# Patient Record
Sex: FEMALE | URBAN - METROPOLITAN AREA
[De-identification: names, ages, dates, MRNs, and addresses within clinical notes are randomized per-mention and may not be internally consistent; named-entity substitution may affect disease eponyms.]

---

## 2024-08-29 ENCOUNTER — TELEPHONE (OUTPATIENT)
Dept: MATERNAL FETAL MEDICINE | Facility: CLINIC | Age: 19
End: 2024-08-29
Payer: MEDICAID

## 2024-08-29 DIAGNOSIS — O43.892 OTHER PLACENTAL DISORDERS, SECOND TRIMESTER: Primary | ICD-10-CM

## 2024-08-30 DIAGNOSIS — O43.892 OTHER PLACENTAL DISORDERS, SECOND TRIMESTER: Primary | ICD-10-CM

## 2024-08-30 PROBLEM — O46.8X2 SUBCHORIONIC HEMORRHAGE OF PLACENTA IN SECOND TRIMESTER: Status: ACTIVE | Noted: 2024-08-30

## 2024-08-30 NOTE — PROGRESS NOTES
"  Maternal Fetal Medicine New Consult    Subjective:     Patient ID: [id number]    Chief Complaint: MFM Consult w/us  (For evaluation of retroplacental clot  /)      HPI: 18 y.o.  female  at 17w0d gestation with Estimated Date of Delivery:  2025. by LMP, consistent with early US. She is sent for MFM consultation for abnormal ultrasound.     She had concern for abnormal ultrasound with "retroplacental clot"/subchorionic hemorrhage on outside ultrasound.  She reports vaginal bleeding early in the 1st trimester but has not had any bleeding since.  She does not remember the last episode of bleeding.  She has increased BMI of 27 at initial MFM consult visit.  She denies any known chronic medical illnesses.  Patient was accompanied by her significant other Kirk       She denies any personal or family history of aneuploidy or anomalies. She denies any exposure to high fevers, viral rashes, illicit drugs or alcohol in this pregnancy.  She denies any leaking fluid, vaginal bleeding, contractions, decreased fetal movement. Denies headaches, visual disturbances, or epigastric pain.       Pregnancy complications include:  Patient Active Problem List   Diagnosis    Subchorionic hemorrhage of placenta in second trimester    (BMI 25.0-29.9)    At high risk for complications of intrauterine pregnancy (IUP)    Abnormal ultrasound of placenta       History reviewed. No pertinent past medical history.    History reviewed. No pertinent surgical history.    Family History   Problem Relation Name Age of Onset    Cancer Maternal Grandmother      Glaucoma Mother         Social History     Socioeconomic History    Marital status: Unknown   Tobacco Use    Smoking status: Never     Passive exposure: Never    Smokeless tobacco: Never   Substance and Sexual Activity    Alcohol use: Never    Drug use: Never    Sexual activity: Yes     Partners: Male       Current Outpatient Medications   Medication Sig Dispense Refill    ondansetron " "(ZOFRAN-ODT) 8 MG TbDL Take 8 mg by mouth every 6 (six) hours as needed.       27 mg iron- 1 mg Tab Take 1 tablet by mouth.      aspirin (ECOTRIN) 81 MG EC tablet Take 1 tablet (81 mg total) by mouth once daily. Start after 12 weeks gestation. 30 tablet 10     No current facility-administered medications for this visit.       Review of patient's allergies indicates:  No Known Allergies     Review of Systems   12 point review of systems conducted, negative except as stated in the history of present illness. See HPI for details.      Objective:     Visit Vitals  /62 (BP Location: Left arm, Patient Position: Sitting, BP Method: Medium (Automatic))   Pulse 75   Ht 5' 2" (1.575 m)   Wt 67.1 kg (148 lb)   LMP  (LMP Unknown)   BMI 27.07 kg/m²        Physical Exam  Vitals and nursing note reviewed.   Constitutional:       General: She is not in acute distress.     Appearance: Normal appearance.   HENT:      Head: Normocephalic and atraumatic.      Nose: Nose normal. No congestion.      Mouth/Throat:      Pharynx: Oropharynx is clear.   Eyes:      General: No scleral icterus.     Conjunctiva/sclera: Conjunctivae normal.   Cardiovascular:      Rate and Rhythm: Normal rate and regular rhythm.   Pulmonary:      Effort: No respiratory distress.      Breath sounds: Normal breath sounds. No wheezing.   Abdominal:      General: Abdomen is flat.      Palpations: Abdomen is soft.      Tenderness: There is no abdominal tenderness. There is no right CVA tenderness, left CVA tenderness or guarding.      Comments: No CVA tenderness gravid uterus.    Musculoskeletal:         General: Normal range of motion.      Cervical back: Neck supple.      Right lower leg: No edema.      Left lower leg: No edema.   Skin:     General: Skin is warm.      Findings: No bruising or rash.   Neurological:      General: No focal deficit present.      Mental Status: She is oriented to person, place, and time.      Deep Tendon Reflexes: Reflexes " normal.      Comments: Normal reflexes   Psychiatric:         Mood and Affect: Mood normal.         Behavior: Behavior normal.         Thought Content: Thought content normal.         Judgment: Judgment normal.         Assessment/Plan:     18 y.o.  female with IUP at 17w0d    Subchorionic hemorrhage  There is normal fetal growth and no anomalies seen on fetal anatomy scan on 9/3/24.  AFV is normal.     Anterior subchorionic hemorrhage seen measuring 2.3 x 3.3 x 1 cm on 9/3/2024.    She was advised that most patients with this do well in pregnancy. However, there is higher association of bleeding in later pregnancy as well as pregnancy loss (if less than 24 weeks) PPROM,  labor and delivery, especially in patient with bleeding episodes rather than asymptomatic subchorionic hemorrhage.  There is no prevention available and advised of need for pelvic rest x 2 weeks from last bleeding episode with expectant management will be done and advised to report any bleeding or increased cramps mainly in the second half of the pregnancy.       Increased BMI in pregnancy, 27 at initial [facility] consult  Body mass index is 27.07 kg/m².    She was advised of the importance of eating healthy and and limiting weight gain to 15-25lbs during the pregnancy, as optimal in this situation. Discussed the importance of losing weight after this pregnancy, especially before attempting future pregnancy. Breastfeeding may be an important tool in reducing the postpartum weight retention. Excess weight gain would be associated with gestational hypertension, gestational diabetes and adverse  outcomes, including fetal demise in utero.       Preeclampsia prophylaxis  With her risk factors for preeclampsia including  nulliparity,  ancestry, and low socioeconomic status, discussed recommendations for low dose aspirin use to decrease risks for adverse outcomes, including preeclampsia, low birth weight and  delivery.  Low-dose aspirin reduced the risk for preeclampsia by 15% in clinical trials and reduced the risk for  birth by 20% and FGR by 20%, and  mortality by around 20%.  After discussing risks/benefits of its use, it was agreed to start asa 81 mg daily today and continue until delivery.. Also, recommend using in all future pregnancies.      No fetal abnormalities or placental abnormalities are seen at this time with a small subchorionic hemorrhage is noted anteriorly.  With no active bleeding since 1st trimester reviewed the options of taking baby aspirin to lower risk of preeclampsia.  Benefits and risks discussed and agreed to start daily baby aspirin.  Diet advice with proper weight gain recommendations reviewed.  Because fetal anatomy scan was incomplete today, patient was scheduled for a follow-up appointment in about 5 weeks. Patient's questions were answered.  She is in agreement with plan of care.      Follow up in about 5 weeks (around 10/8/2024) for [facility] follow-up, Repeat ultrasound, to complete anatomy scan.     Future Appointments   Date Time Provider Department Center   10/8/2024  9:00 AM Chencho Edouard MD [facility] [place]   10/8/2024  9:00 AM ROOM 2, [facility] [place]          Patient was evaluated by MILTON Altamirano and Dr. Edouard.  Final assessment and recommendations as stated above were made by Dr. Edouard.    This note was created with the assistance of [employer] voice recognition software. There may be transcription errors as a result of using this technology, however minimal. Effort has been made to ensure accuracy of transcription, but any obvious errors or omissions should be clarified with the author of the document.

## 2024-09-03 ENCOUNTER — PROCEDURE VISIT (OUTPATIENT)
Dept: MATERNAL FETAL MEDICINE | Facility: CLINIC | Age: 19
End: 2024-09-03
Payer: MEDICAID

## 2024-09-03 ENCOUNTER — OFFICE VISIT (OUTPATIENT)
Dept: MATERNAL FETAL MEDICINE | Facility: CLINIC | Age: 19
End: 2024-09-03
Payer: MEDICAID

## 2024-09-03 VITALS
HEART RATE: 75 BPM | WEIGHT: 148 LBS | SYSTOLIC BLOOD PRESSURE: 107 MMHG | BODY MASS INDEX: 27.23 KG/M2 | DIASTOLIC BLOOD PRESSURE: 62 MMHG | HEIGHT: 62 IN

## 2024-09-03 DIAGNOSIS — E66.3 OVERWEIGHT (BMI 25.0-29.9): ICD-10-CM

## 2024-09-03 DIAGNOSIS — O09.90 AT HIGH RISK FOR COMPLICATIONS OF INTRAUTERINE PREGNANCY (IUP): ICD-10-CM

## 2024-09-03 DIAGNOSIS — O43.892 OTHER PLACENTAL DISORDERS, SECOND TRIMESTER: ICD-10-CM

## 2024-09-03 DIAGNOSIS — O46.8X2 SUBCHORIONIC HEMORRHAGE OF PLACENTA IN SECOND TRIMESTER: Primary | ICD-10-CM

## 2024-09-03 DIAGNOSIS — O28.3 ABNORMAL ULTRASOUND OF PLACENTA: ICD-10-CM

## 2024-09-03 PROCEDURE — 76811 OB US DETAILED SNGL FETUS: CPT | Mod: S$GLB,,, | Performed by: OBSTETRICS & GYNECOLOGY

## 2024-09-03 PROCEDURE — 3074F SYST BP LT 130 MM HG: CPT | Mod: CPTII,S$GLB,, | Performed by: OBSTETRICS & GYNECOLOGY

## 2024-09-03 PROCEDURE — 1159F MED LIST DOCD IN RCRD: CPT | Mod: CPTII,S$GLB,, | Performed by: OBSTETRICS & GYNECOLOGY

## 2024-09-03 PROCEDURE — 1160F RVW MEDS BY RX/DR IN RCRD: CPT | Mod: CPTII,S$GLB,, | Performed by: OBSTETRICS & GYNECOLOGY

## 2024-09-03 PROCEDURE — 3078F DIAST BP <80 MM HG: CPT | Mod: CPTII,S$GLB,, | Performed by: OBSTETRICS & GYNECOLOGY

## 2024-09-03 PROCEDURE — 3008F BODY MASS INDEX DOCD: CPT | Mod: CPTII,S$GLB,, | Performed by: OBSTETRICS & GYNECOLOGY

## 2024-09-03 PROCEDURE — 99203 OFFICE O/P NEW LOW 30 MIN: CPT | Mod: TH,S$GLB,, | Performed by: OBSTETRICS & GYNECOLOGY

## 2024-09-03 RX ORDER — METRONIDAZOLE 500 MG/1
TABLET ORAL 2 TIMES DAILY
COMMUNITY
Start: 2024-07-30 | End: 2024-09-03

## 2024-09-03 RX ORDER — ASPIRIN 81 MG/1
81 TABLET ORAL DAILY
Qty: 30 TABLET | Refills: 10 | Status: SHIPPED | OUTPATIENT
Start: 2024-09-03 | End: 2025-09-03

## 2024-09-03 RX ORDER — NITROFURANTOIN 25; 75 MG/1; MG/1
100 CAPSULE ORAL 2 TIMES DAILY
COMMUNITY
Start: 2024-07-08 | End: 2024-09-03

## 2024-09-03 RX ORDER — ONDANSETRON 8 MG/1
8 TABLET, ORALLY DISINTEGRATING ORAL EVERY 6 HOURS PRN
COMMUNITY
Start: 2024-06-01

## 2024-09-03 RX ORDER — ASCORBIC ACID, CHOLECALCIFEROL, DL-.ALPHA.-TOCOPHEROL ACETATE, THIAMINE HYDROCHLORIDE, RIBOFLAVIN, NIACINAMIDE, PYRIDOXINE HYDROCHLORIDE, FOLIC ACID, CYANOCOBALAMIN, CALCIUM CARBONATE, FERROUS FUMARATE, ZINC OXIDE, CUPRIC SULFATE 100; 400; 30; 1.6; 1.6; 20; 3.1; 1; 12; 200; 27; 10; 2 MG/1; [IU]/1; [IU]/1; MG/1; MG/1; MG/1; MG/1; MG/1; UG/1; MG/1; MG/1; MG/1; MG/1
1 TABLET, COATED ORAL
COMMUNITY
Start: 2024-07-30

## 2024-10-10 DIAGNOSIS — O28.3 ABNORMAL ULTRASOUND OF PLACENTA: ICD-10-CM

## 2024-10-10 DIAGNOSIS — Z36.2 ENCOUNTER FOR FOLLOW-UP ULTRASOUND OF FETAL ANATOMY: Primary | ICD-10-CM

## 2024-10-15 ENCOUNTER — TELEPHONE (OUTPATIENT)
Dept: MATERNAL FETAL MEDICINE | Facility: CLINIC | Age: 19
End: 2024-10-15

## 2024-10-15 NOTE — TELEPHONE ENCOUNTER
"Attempted to call the patient in reference to her missed appt. Unable to reach due to recording "call can not be completed as dialed"     Faxed missed appt notice to ob   "

## 2024-10-24 DIAGNOSIS — O28.3 ABNORMAL ULTRASOUND OF PLACENTA: Primary | ICD-10-CM

## 2024-10-24 DIAGNOSIS — Z36.2 ENCOUNTER FOR FOLLOW-UP ULTRASOUND OF FETAL ANATOMY: ICD-10-CM

## 2024-10-29 ENCOUNTER — OFFICE VISIT (OUTPATIENT)
Dept: MATERNAL FETAL MEDICINE | Facility: CLINIC | Age: 19
End: 2024-10-29
Payer: MEDICAID

## 2024-10-29 ENCOUNTER — PROCEDURE VISIT (OUTPATIENT)
Dept: MATERNAL FETAL MEDICINE | Facility: CLINIC | Age: 19
End: 2024-10-29
Payer: MEDICAID

## 2024-10-29 VITALS
SYSTOLIC BLOOD PRESSURE: 97 MMHG | BODY MASS INDEX: 28.71 KG/M2 | HEIGHT: 62 IN | HEART RATE: 72 BPM | WEIGHT: 156 LBS | DIASTOLIC BLOOD PRESSURE: 64 MMHG

## 2024-10-29 DIAGNOSIS — O28.3 ABNORMAL ULTRASOUND OF PLACENTA: ICD-10-CM

## 2024-10-29 DIAGNOSIS — Z36.2 ENCOUNTER FOR FOLLOW-UP ULTRASOUND OF FETAL ANATOMY: ICD-10-CM

## 2024-10-29 DIAGNOSIS — O46.8X2 SUBCHORIONIC HEMORRHAGE OF PLACENTA IN SECOND TRIMESTER: ICD-10-CM

## 2024-10-29 DIAGNOSIS — O36.5920 LIGHT FOR DATES AFFECTING MANAGEMENT OF MOTHER, SECOND TRIMESTER, NOT APPLICABLE OR UNSPECIFIED FETUS: ICD-10-CM

## 2024-10-29 DIAGNOSIS — E66.3 OVERWEIGHT (BMI 25.0-29.9): Primary | ICD-10-CM

## 2024-10-29 PROCEDURE — 76816 OB US FOLLOW-UP PER FETUS: CPT | Mod: S$GLB,,, | Performed by: OBSTETRICS & GYNECOLOGY

## 2024-10-29 PROCEDURE — 99213 OFFICE O/P EST LOW 20 MIN: CPT | Mod: TH,S$GLB,, | Performed by: OBSTETRICS & GYNECOLOGY

## 2024-10-29 PROCEDURE — 3078F DIAST BP <80 MM HG: CPT | Mod: CPTII,S$GLB,, | Performed by: OBSTETRICS & GYNECOLOGY

## 2024-10-29 PROCEDURE — 3008F BODY MASS INDEX DOCD: CPT | Mod: CPTII,S$GLB,, | Performed by: OBSTETRICS & GYNECOLOGY

## 2024-10-29 PROCEDURE — 1160F RVW MEDS BY RX/DR IN RCRD: CPT | Mod: CPTII,S$GLB,, | Performed by: OBSTETRICS & GYNECOLOGY

## 2024-10-29 PROCEDURE — 76820 UMBILICAL ARTERY ECHO: CPT | Mod: S$GLB,,, | Performed by: OBSTETRICS & GYNECOLOGY

## 2024-10-29 PROCEDURE — 3074F SYST BP LT 130 MM HG: CPT | Mod: CPTII,S$GLB,, | Performed by: OBSTETRICS & GYNECOLOGY

## 2024-10-29 PROCEDURE — 1159F MED LIST DOCD IN RCRD: CPT | Mod: CPTII,S$GLB,, | Performed by: OBSTETRICS & GYNECOLOGY

## 2024-11-25 PROBLEM — O46.8X3 SUBCHORIONIC HEMORRHAGE OF PLACENTA IN THIRD TRIMESTER: Status: ACTIVE | Noted: 2024-08-30

## 2024-11-25 NOTE — PROGRESS NOTES
"  Maternal Fetal Medicine Follow Up    Subjective:     Patient ID: [id number]    Chief Complaint: m followup w/us      HPI: Riana Cisse is a 19 y.o. female  at 29w0d gestation with Estimated Date of Delivery: 25  who is here for follow-up consultation by M.    She had anterior subchorionic hemorrhage seen on previous ultrasound.  She has not had any vaginal bleeding since early 1st trimester.  She has increased BMI of 27 at initial MFM consult visit.  She is on low-dose aspirin daily.  She had low normal fetal growth noted on 10/29/2024 and is here to follow-up on that today.       Interval history since last M visit: None.. She denies any leaking fluid, vaginal bleeding, contractions, decreased fetal movement. Denies headaches, visual disturbances, or epigastric pain.    Pregnancy complications include:   Patient Active Problem List   Diagnosis    Subchorionic hemorrhage of placenta in third trimester    (BMI 25.0-29.9)    At high risk for complications of intrauterine pregnancy (IUP)    Abnormal ultrasound of placenta    Light for dates affecting management of mother, second trimester, not applicable or unspecified fetus       No changes to medical, surgical, family, social, or obstetric history.    Medications:  Current Outpatient Medications   Medication Instructions    aspirin (ECOTRIN) 81 mg, Oral, Daily, Start after 12 weeks gestation.    ondansetron (ZOFRAN-ODT) 8 mg, Every 6 hours PRN     27 mg iron- 1 mg Tab 1 tablet       Review of Systems   12 point review of systems conducted, negative except as stated in the history of present illness. See HPI for details.      Objective:     Visit Vitals  /80 (BP Location: Left arm, Patient Position: Sitting)   Pulse 110   Ht 5' 2" (1.575 m)   Wt 77.1 kg (170 lb)   LMP  (LMP Unknown)   BMI 31.09 kg/m²        Physical Exam  Vitals and nursing note reviewed.   Constitutional:       Appearance: Normal appearance.   HENT:      Head: " Normocephalic and atraumatic.      Nose: Nose normal. No congestion.   Cardiovascular:      Rate and Rhythm: Normal rate.   Pulmonary:      Effort: Pulmonary effort is normal.   Skin:     Findings: No rash.   Neurological:      Mental Status: She is alert and oriented to person, place, and time.   Psychiatric:         Mood and Affect: Mood normal.         Behavior: Behavior normal.         Thought Content: Thought content normal.         Judgment: Judgment normal.         Assessment/Plan:     19 y.o.  female with IUP at 29w0d    Subchorionic hemorrhage  There is low normal and continue fetal growth with an EFW of 1186 g at the 28% and the AC at the 16% on 24.  AFV is normal.       Anterior subchorionic hemorrhage seen measuring 2.1 x 0.5 cm on 2024.    There is no prevention available and reviewed need for pelvic rest x 2 weeks from last bleeding episode with expectant management will be done and advised to report any bleeding or increased cramps mainly in the second half of the pregnancy.       Low-normal fetal growth  Because of range of error of ultrasound at this gestational age and possibility of more significant fetal growth restriction, a biophysical profile was done which was 8/8, and umbilical artery Doppler was done which was normal.     There is continued fetal growth at this time.  With no additional risk factors, no follow-up appointment was given at this time.  However, we would be happy to see her back if any concern arises later.  Fetal kick count instructions reviewed.      BMI 27 at initial MFM consult  She was advised of the importance of eating healthy and and limiting weight gain to 15-25lbs during the pregnancy, as optimal in this situation. Discussed the importance of losing weight after this pregnancy, especially before attempting future pregnancy. Breastfeeding may be an important tool in reducing the postpartum weight retention. Excess weight gain would be associated with  gestational hypertension, gestational diabetes and adverse  outcomes, including fetal demise in utero.       Preeclampsia prophylaxis  With her increased risk for preeclampsia, she agreed to continue asa 81 mg daily until delivery. Preeclampsia precautions reviewed.     There is continued fetal growth at the same rate with the abdominal circumference 1 week behind that is a normal finding at this time with normal umbilical artery Dopplers with no further slowing growth.  No follow-up appointment was scheduled.  Regular pregnancy care with Dr. Pino we will be happy to see her again at any time at Dr. Pino's discretion.    Follow up for None. Will see back at primary OB's discretion.     No future appointments.     ELIAS involvement: Patient was evaluated and examined by Dr. Edouard. MILTON Altamirano, helped in pre charting of part of note.    This note was created with the assistance of [employer] voice recognition software. There may be transcription errors as a result of using this technology, however minimal. Effort has been made to ensure accuracy of transcription, but any obvious errors or omissions should be clarified with the author of the document.

## 2024-11-26 ENCOUNTER — PROCEDURE VISIT (OUTPATIENT)
Dept: MATERNAL FETAL MEDICINE | Facility: CLINIC | Age: 19
End: 2024-11-26
Payer: MEDICAID

## 2024-11-26 ENCOUNTER — OFFICE VISIT (OUTPATIENT)
Dept: MATERNAL FETAL MEDICINE | Facility: CLINIC | Age: 19
End: 2024-11-26
Payer: MEDICAID

## 2024-11-26 VITALS
HEIGHT: 62 IN | BODY MASS INDEX: 31.28 KG/M2 | SYSTOLIC BLOOD PRESSURE: 133 MMHG | WEIGHT: 170 LBS | HEART RATE: 110 BPM | DIASTOLIC BLOOD PRESSURE: 80 MMHG

## 2024-11-26 DIAGNOSIS — O46.8X3 SUBCHORIONIC HEMORRHAGE OF PLACENTA IN THIRD TRIMESTER: Primary | ICD-10-CM

## 2024-11-26 DIAGNOSIS — E66.3 OVERWEIGHT (BMI 25.0-29.9): ICD-10-CM

## 2024-11-26 DIAGNOSIS — O28.3 ABNORMAL ULTRASOUND OF PLACENTA: ICD-10-CM

## 2024-11-26 DIAGNOSIS — Z36.2 ENCOUNTER FOR FOLLOW-UP ULTRASOUND OF FETAL ANATOMY: ICD-10-CM

## 2024-11-26 DIAGNOSIS — O36.5920 LIGHT FOR DATES AFFECTING MANAGEMENT OF MOTHER, SECOND TRIMESTER, NOT APPLICABLE OR UNSPECIFIED FETUS: ICD-10-CM

## 2024-11-26 PROCEDURE — 3075F SYST BP GE 130 - 139MM HG: CPT | Mod: CPTII,S$GLB,, | Performed by: OBSTETRICS & GYNECOLOGY

## 2024-11-26 PROCEDURE — 3079F DIAST BP 80-89 MM HG: CPT | Mod: CPTII,S$GLB,, | Performed by: OBSTETRICS & GYNECOLOGY

## 2024-11-26 PROCEDURE — 1160F RVW MEDS BY RX/DR IN RCRD: CPT | Mod: CPTII,S$GLB,, | Performed by: OBSTETRICS & GYNECOLOGY

## 2024-11-26 PROCEDURE — 3008F BODY MASS INDEX DOCD: CPT | Mod: CPTII,S$GLB,, | Performed by: OBSTETRICS & GYNECOLOGY

## 2024-11-26 PROCEDURE — 76820 UMBILICAL ARTERY ECHO: CPT | Mod: S$GLB,,, | Performed by: OBSTETRICS & GYNECOLOGY

## 2024-11-26 PROCEDURE — 1159F MED LIST DOCD IN RCRD: CPT | Mod: CPTII,S$GLB,, | Performed by: OBSTETRICS & GYNECOLOGY

## 2024-11-26 PROCEDURE — 99213 OFFICE O/P EST LOW 20 MIN: CPT | Mod: TH,S$GLB,, | Performed by: OBSTETRICS & GYNECOLOGY

## 2024-11-26 PROCEDURE — 76816 OB US FOLLOW-UP PER FETUS: CPT | Mod: S$GLB,,, | Performed by: OBSTETRICS & GYNECOLOGY
